# Patient Record
Sex: MALE | Race: WHITE | Employment: OTHER | URBAN - METROPOLITAN AREA
[De-identification: names, ages, dates, MRNs, and addresses within clinical notes are randomized per-mention and may not be internally consistent; named-entity substitution may affect disease eponyms.]

---

## 2024-09-12 ENCOUNTER — TELEPHONE (OUTPATIENT)
Dept: CARDIOLOGY | Facility: CLINIC | Age: 74
End: 2024-09-12

## 2024-09-12 NOTE — TELEPHONE ENCOUNTER
----- Message from Liang Ceron sent at 9/11/2024 10:02 PM EDT -----  Please schedule patient for a pharmacologic nuclear stress test at [facility] due to his elevated CT coronary calcium score  ----- Message -----  From: [place]  Sent: 9/6/2024   3:24 PM EDT  To: Liang Ceron MD    Patient need order for a stress test. Contact number is [phone]  Counts include 234 beds at the [facility]

## 2025-07-13 NOTE — PROGRESS NOTES
Primary Care Physician: Omayra Ann MD (Inactive)  Date of Visit: 07/17/2025 10:30 AM EDT  Location of visit: [facility]     Chief Complaint: No chief complaint on file.    HPI / Summary:   Jim Rodriguez is a 74 y.o. male presents for     ROS    Medical History:   He has a past medical history of Personal history of other malignant neoplasm of stomach (01/26/2018).  Surgical Hx:   He has a past surgical history that includes Other surgical history (01/26/2018).   Social Hx:   He has no history on file for tobacco use, alcohol use, and drug use.  Family Hx:   His family history is not on file.   Allergies:  RX Allergies[1]  Outpatient Medications:  No current outpatient medications  Physical Exam:  There were no vitals filed for this visit.  Wt Readings from Last 5 Encounters:   03/25/22 110 kg (242 lb)     Physical Exam  JVP not elevated. Carotid impulses are 2+ without overlying bruit.   Chest exhibits fair to good air movement with completely clear breath sounds.   The cardiac rhythm is regular with no premature beats.   Normal S1 and S2. No gallop, murmur or rub, or click.   Abdomen is soft and benign without focal tenderness.   With no lower leg edema. The pedal pulses are intact.     Last Labs:  No visits with results within 6 Month(s) from this visit.   Latest known visit with results is:   [facility] Encounter on 10/22/2009   Component Date Value    Pathology Report 10/22/2009                      Value:JIM Trejo                                                                                                   Accession #: [id number]            Pathologist:                   JARET MORATAYA MD  Date of Procedure:    10/22/2009  Date Received:          10/22/2009  Date Reported           10/26/2009  Submitting Physician:   PEDRO GALVEZ MD  [facility]   Copy To/Referring/Attending:  OMAYRA ANN MD Other External #                                                 "                    FINAL DIAGNOSIS  A.  SIGMOID COLON, BIOPSY:  --COLONIC MUCOSA, NO SIGNIFICANT PATHOLOGIC ABNORMALITY.      B.  SIGMOID POLYP, COLD SNARE:  --TUBULAR ADENOMA.                                                                                                                                                                                                                                                                                                                                                                                                                                                                                                                   Electronically Signed Out By JARET MORATAYA MD/MAICO  By the signature on this report, the individual or group listed as making the  Final Interpretation/Diagnosis certifies that they have reviewed this case.           Clinical History:  (a) Peridiverticular erythema (b) polyp    Specimens Submitted As:  A: SIGMOID COLON BX   B: SIGMOID POLYP (COLD SNARE)     Gross Description:  A:Received in formalin labeled with the patient's name, and \"1-patricia diver ngoc\",  are two irregular, tan-brown, soft tissue fragments measuring 0.2 x 0.2 x 0.1  cm and 0.3 x 0.2 x 0.15 cm. Submitted in toto in one cassette.  ALT    B:Received in formalin labeled with the patient's name, and \"2-sig polyp\", is  one irregular to polypoid, pale tan to tan, soft tissue fragment measuring 0.5  x 0.3 x 0.2 cm. Submitted in toto in one cassette.  ALT    alt/10/23/200                          9               [facility]  Department of Pathology   [address] [place] [address]        CONVERTED FINAL DIAGNOSIS 10/22/2009                      Value:A.  SIGMOID COLON, BIOPSY:  --COLONIC MUCOSA, NO SIGNIFICANT PATHOLOGIC ABNORMALITY.      B.  SIGMOID POLYP, COLD SNARE:  --TUBULAR ADENOMA.          CONVERTED CLINICAL DIAGN* 10/22/2009 (a) " "Peridiverticular erythema (b) polyp     CONVERTED GROSS DESCRIPT* 10/22/2009                      Value:A:Received in formalin labeled with the patient's name, and \"1-patricia diver ngoc\",  are two irregular, tan-brown, soft tissue fragments measuring 0.2 x 0.2 x 0.1  cm and 0.3 x 0.2 x 0.15 cm. Submitted in toto in one cassette.  ALT    B:Received in formalin labeled with the patient's name, and \"2-sig polyp\", is  one irregular to polypoid, pale tan to tan, soft tissue fragment measuring 0.5  x 0.3 x 0.2 cm. Submitted in toto in one cassette.  ALT      CONVERTED FINAL REPORT P* 10/22/2009 \\copathshare\copath\PDF 2022_[id number]         Assessment/Plan   1. Hypertension. Blood pressure well controlled at today's office visit. The patient will be continued on losartan 100 mg daily and HCTZ 25 mg daily.  2. CAD. The patient had a negative stress echo in September 2007. Patient had a coronary artery calcium score of 350 when done January 2018. Will have patient return in 6 months with exercise nuclear medicine stress testing and echocardiogram. EKG done today shows sinus bradycardia.  3. Hyperlipidemia. The patient will continue on atorvastatin 80 mg daily. The patient does have his lab work performed at [facility]. [facility] showed cholesterol 174, HDL 52, , triglyceride 193. Patient states he takes atorvastatin 80 mg daily compliantly. Will start Zetia 10 mg daily.  4. Colonic polypectomy, October 18, 2009 with Dr. Levine.  5. DJD of left hip.  6. Status post excision of cyst from left side.  7. Cervical spine DJD.           Orders:  No orders of the defined types were placed in this encounter.     Followup Appts:  Future Appointments   Date Time Provider Department Center   7/17/2025 10:30 AM Liang Ceron MD [id number]           ____________________________________________________________  Liang Ceron MD  [facility] " [place]  Assistant Clinical Professor, [facility]  [facility]       [1] Not on File

## 2025-07-17 ENCOUNTER — OFFICE VISIT (OUTPATIENT)
Dept: CARDIOLOGY | Facility: CLINIC | Age: 75
End: 2025-07-17
Payer: MEDICARE

## 2025-07-17 VITALS
OXYGEN SATURATION: 96 % | DIASTOLIC BLOOD PRESSURE: 80 MMHG | BODY MASS INDEX: 32.41 KG/M2 | WEIGHT: 239 LBS | SYSTOLIC BLOOD PRESSURE: 162 MMHG | HEART RATE: 70 BPM

## 2025-07-17 DIAGNOSIS — I10 ESSENTIAL (PRIMARY) HYPERTENSION: Primary | ICD-10-CM

## 2025-07-17 PROCEDURE — 1159F MED LIST DOCD IN RCRD: CPT | Performed by: INTERNAL MEDICINE

## 2025-07-17 PROCEDURE — G2211 COMPLEX E/M VISIT ADD ON: HCPCS | Performed by: INTERNAL MEDICINE

## 2025-07-17 PROCEDURE — 3078F DIAST BP <80 MM HG: CPT | Performed by: INTERNAL MEDICINE

## 2025-07-17 PROCEDURE — 99202 OFFICE O/P NEW SF 15 MIN: CPT

## 2025-07-17 PROCEDURE — 3077F SYST BP >= 140 MM HG: CPT | Performed by: INTERNAL MEDICINE

## 2025-07-17 PROCEDURE — 1160F RVW MEDS BY RX/DR IN RCRD: CPT | Performed by: INTERNAL MEDICINE

## 2025-07-17 PROCEDURE — 99214 OFFICE O/P EST MOD 30 MIN: CPT | Performed by: INTERNAL MEDICINE

## 2025-07-17 RX ORDER — PANTOPRAZOLE SODIUM 40 MG/1
40 TABLET, DELAYED RELEASE ORAL 2 TIMES DAILY
COMMUNITY
Start: 2025-01-28

## 2025-07-17 RX ORDER — METFORMIN HYDROCHLORIDE 500 MG/1
500 TABLET ORAL
COMMUNITY
Start: 2025-01-28

## 2025-07-17 RX ORDER — LOSARTAN POTASSIUM AND HYDROCHLOROTHIAZIDE 25; 100 MG/1; MG/1
1 TABLET ORAL DAILY
Qty: 90 TABLET | Refills: 3 | Status: SHIPPED | OUTPATIENT
Start: 2025-07-17 | End: 2026-07-17

## 2025-07-17 RX ORDER — AMLODIPINE BESYLATE 5 MG/1
5 TABLET ORAL DAILY
COMMUNITY

## 2025-07-17 RX ORDER — EZETIMIBE 10 MG/1
10 TABLET ORAL DAILY
COMMUNITY
Start: 2022-03-25

## 2025-07-17 RX ORDER — DOXEPIN HYDROCHLORIDE 10 MG/1
10 CAPSULE ORAL NIGHTLY PRN
COMMUNITY
Start: 2023-11-29

## 2025-07-17 RX ORDER — ATORVASTATIN CALCIUM 80 MG/1
80 TABLET, FILM COATED ORAL
COMMUNITY

## 2025-07-17 RX ORDER — SELENIUM SULFIDE 2.5 MG/100ML
LOTION TOPICAL DAILY PRN
COMMUNITY
Start: 2025-01-28

## 2025-07-17 RX ORDER — ASPIRIN 81 MG/1
81 TABLET ORAL DAILY
COMMUNITY

## 2025-07-17 RX ORDER — SILDENAFIL 50 MG/1
50 TABLET, FILM COATED ORAL AS NEEDED
COMMUNITY
Start: 2024-04-22

## 2025-07-17 RX ORDER — FLUTICASONE PROPIONATE 50 MCG
2 SPRAY, SUSPENSION (ML) NASAL DAILY
COMMUNITY
Start: 2024-03-13

## 2025-07-17 RX ORDER — PAROXETINE 40 MG/1
40 TABLET, FILM COATED ORAL
COMMUNITY

## 2025-07-17 RX ORDER — METRONIDAZOLE 7.5 MG/G
1 CREAM TOPICAL 2 TIMES DAILY
COMMUNITY
Start: 2024-08-01

## 2025-07-17 ASSESSMENT — PATIENT HEALTH QUESTIONNAIRE - PHQ9
2. FEELING DOWN, DEPRESSED OR HOPELESS: NOT AT ALL
SUM OF ALL RESPONSES TO PHQ9 QUESTIONS 1 AND 2: 0
1. LITTLE INTEREST OR PLEASURE IN DOING THINGS: NOT AT ALL

## 2025-07-17 ASSESSMENT — ENCOUNTER SYMPTOMS
LOSS OF SENSATION IN FEET: 0
OCCASIONAL FEELINGS OF UNSTEADINESS: 0
DEPRESSION: 0